# Patient Record
Sex: MALE | Race: BLACK OR AFRICAN AMERICAN | NOT HISPANIC OR LATINO | URBAN - METROPOLITAN AREA
[De-identification: names, ages, dates, MRNs, and addresses within clinical notes are randomized per-mention and may not be internally consistent; named-entity substitution may affect disease eponyms.]

---

## 2020-06-05 ENCOUNTER — OFFICE VISIT (OUTPATIENT)
Dept: URBAN - METROPOLITAN AREA CLINIC 70 | Facility: CLINIC | Age: 75
End: 2020-06-05
Payer: MEDICARE

## 2020-06-05 ENCOUNTER — LAB OUTSIDE AN ENCOUNTER (OUTPATIENT)
Dept: URBAN - METROPOLITAN AREA CLINIC 70 | Facility: CLINIC | Age: 75
End: 2020-06-05

## 2020-06-05 DIAGNOSIS — D50.0 IRON DEFICIENCY ANEMIA DUE TO CHRONIC BLOOD LOSS: ICD-10-CM

## 2020-06-05 PROCEDURE — 99213 OFFICE O/P EST LOW 20 MIN: CPT | Performed by: INTERNAL MEDICINE

## 2020-06-05 PROCEDURE — G8427 DOCREV CUR MEDS BY ELIG CLIN: HCPCS | Performed by: INTERNAL MEDICINE

## 2020-06-05 PROCEDURE — 3017F COLORECTAL CA SCREEN DOC REV: CPT | Performed by: INTERNAL MEDICINE

## 2020-06-05 PROCEDURE — G8418 CALC BMI BLW LOW PARAM F/U: HCPCS | Performed by: INTERNAL MEDICINE

## 2020-06-05 PROCEDURE — G9903 PT SCRN TBCO ID AS NON USER: HCPCS | Performed by: INTERNAL MEDICINE

## 2020-06-05 PROCEDURE — 1036F TOBACCO NON-USER: CPT | Performed by: INTERNAL MEDICINE

## 2020-06-05 RX ORDER — ASPIRIN 81 MG/1
(PRIOR AUTH#:000009151623) TABLET ORAL
Qty: 120 DELAYED RELEASE TABLET | Status: ACTIVE | COMMUNITY

## 2020-06-05 RX ORDER — GLIPIZIDE 5 MG/1
(PRIOR AUTH#:000009151628) TABLET ORAL
Qty: 180 DELAYED RELEASE TABLET | Status: ACTIVE | COMMUNITY

## 2020-06-05 RX ORDER — PRAVASTATIN SODIUM 40 MG/1
(PRIOR AUTH#:000009151634) TABLET ORAL
Qty: 90 DELAYED RELEASE TABLET | Status: ACTIVE | COMMUNITY

## 2020-06-05 RX ORDER — LORATADINE 10 MG/1
(PRIOR AUTH#:000009151631) TABLET ORAL
Qty: 90 DELAYED RELEASE TABLET | Status: ACTIVE | COMMUNITY

## 2020-06-05 RX ORDER — LOSARTAN POTASSIUM 25 MG/1
(PRIOR AUTH#:000009151632) TABLET ORAL
Qty: 90 DELAYED RELEASE TABLET | Status: ACTIVE | COMMUNITY

## 2020-06-05 RX ORDER — FLUTICASONE PROPIONATE 50 UG/1
(PRIOR AUTH#:000009151627) SPRAY, METERED NASAL
Qty: 3 SP | Status: ACTIVE | COMMUNITY

## 2020-06-05 RX ORDER — FERROUS SULFATE 325(65) MG
(PRIOR AUTH#:000009252611) TABLET ORAL
Qty: 30 DELAYED RELEASE TABLET | Status: ACTIVE | COMMUNITY

## 2020-06-05 NOTE — HPI-TODAY'S VISIT:
pt feels well  hgb was ok   he cont on iron tid   he has not been able to get copy of colonoscopy  i have looked at all the place i know to look and check for   he cannot give he a name or address of where this was done

## 2020-07-06 ENCOUNTER — OFFICE VISIT (OUTPATIENT)
Dept: URBAN - METROPOLITAN AREA SURGERY CENTER 24 | Facility: SURGERY CENTER | Age: 75
End: 2020-07-06

## 2020-07-06 ENCOUNTER — CLAIMS CREATED FROM THE CLAIM WINDOW (OUTPATIENT)
Dept: URBAN - METROPOLITAN AREA CLINIC 4 | Facility: CLINIC | Age: 75
End: 2020-07-06
Payer: MEDICARE

## 2020-07-06 ENCOUNTER — OFFICE VISIT (OUTPATIENT)
Dept: URBAN - METROPOLITAN AREA SURGERY CENTER 24 | Facility: SURGERY CENTER | Age: 75
End: 2020-07-06
Payer: MEDICARE

## 2020-07-06 DIAGNOSIS — K63.5 BENIGN COLON POLYP: ICD-10-CM

## 2020-07-06 DIAGNOSIS — D50.9 ANEMIA, IRON DEFICIENCY: ICD-10-CM

## 2020-07-06 DIAGNOSIS — D12.5 BENIGN NEOPLASM OF SIGMOID COLON: ICD-10-CM

## 2020-07-06 PROCEDURE — G9937 DIG OR SURV COLSCO: HCPCS | Performed by: INTERNAL MEDICINE

## 2020-07-06 PROCEDURE — G8907 PT DOC NO EVENTS ON DISCHARG: HCPCS | Performed by: INTERNAL MEDICINE

## 2020-07-06 PROCEDURE — 88305 TISSUE EXAM BY PATHOLOGIST: CPT | Performed by: PATHOLOGY

## 2020-07-06 PROCEDURE — 45380 COLONOSCOPY AND BIOPSY: CPT | Performed by: INTERNAL MEDICINE

## 2020-07-06 RX ORDER — LORATADINE 10 MG/1
(PRIOR AUTH#:000009151631) TABLET ORAL
Qty: 90 DELAYED RELEASE TABLET | Status: ACTIVE | COMMUNITY

## 2020-07-06 RX ORDER — LOSARTAN POTASSIUM 25 MG/1
(PRIOR AUTH#:000009151632) TABLET ORAL
Qty: 90 DELAYED RELEASE TABLET | Status: ACTIVE | COMMUNITY

## 2020-07-06 RX ORDER — PRAVASTATIN SODIUM 40 MG/1
(PRIOR AUTH#:000009151634) TABLET ORAL
Qty: 90 DELAYED RELEASE TABLET | Status: ACTIVE | COMMUNITY

## 2020-07-06 RX ORDER — FLUTICASONE PROPIONATE 50 UG/1
(PRIOR AUTH#:000009151627) SPRAY, METERED NASAL
Qty: 3 SP | Status: ACTIVE | COMMUNITY

## 2020-07-06 RX ORDER — GLIPIZIDE 5 MG/1
(PRIOR AUTH#:000009151628) TABLET ORAL
Qty: 180 DELAYED RELEASE TABLET | Status: ACTIVE | COMMUNITY

## 2020-07-06 RX ORDER — ASPIRIN 81 MG/1
(PRIOR AUTH#:000009151623) TABLET ORAL
Qty: 120 DELAYED RELEASE TABLET | Status: ACTIVE | COMMUNITY

## 2020-07-06 RX ORDER — FERROUS SULFATE 325(65) MG
(PRIOR AUTH#:000009252611) TABLET ORAL
Qty: 30 DELAYED RELEASE TABLET | Status: ACTIVE | COMMUNITY

## 2020-07-10 ENCOUNTER — OFFICE VISIT (OUTPATIENT)
Dept: URBAN - METROPOLITAN AREA SURGERY CENTER 24 | Facility: SURGERY CENTER | Age: 75
End: 2020-07-10

## 2020-10-02 ENCOUNTER — WEB ENCOUNTER (OUTPATIENT)
Dept: URBAN - METROPOLITAN AREA CLINIC 70 | Facility: CLINIC | Age: 75
End: 2020-10-02

## 2020-10-02 ENCOUNTER — OFFICE VISIT (OUTPATIENT)
Dept: URBAN - METROPOLITAN AREA CLINIC 70 | Facility: CLINIC | Age: 75
End: 2020-10-02
Payer: MEDICARE

## 2020-10-02 DIAGNOSIS — D64.9 ANEMIA: ICD-10-CM

## 2020-10-02 PROCEDURE — 3017F COLORECTAL CA SCREEN DOC REV: CPT | Performed by: INTERNAL MEDICINE

## 2020-10-02 PROCEDURE — 99213 OFFICE O/P EST LOW 20 MIN: CPT | Performed by: INTERNAL MEDICINE

## 2020-10-02 PROCEDURE — G8427 DOCREV CUR MEDS BY ELIG CLIN: HCPCS | Performed by: INTERNAL MEDICINE

## 2020-10-02 PROCEDURE — 1036F TOBACCO NON-USER: CPT | Performed by: INTERNAL MEDICINE

## 2020-10-02 PROCEDURE — G8417 CALC BMI ABV UP PARAM F/U: HCPCS | Performed by: INTERNAL MEDICINE

## 2020-10-02 PROCEDURE — G8484 FLU IMMUNIZE NO ADMIN: HCPCS | Performed by: INTERNAL MEDICINE

## 2020-10-02 RX ORDER — LORATADINE 10 MG/1
(PRIOR AUTH#:000009151631) TABLET ORAL
Qty: 90 DELAYED RELEASE TABLET | Status: ACTIVE | COMMUNITY

## 2020-10-02 RX ORDER — LOSARTAN POTASSIUM 25 MG/1
(PRIOR AUTH#:000009151632) TABLET ORAL
Qty: 90 DELAYED RELEASE TABLET | Status: ACTIVE | COMMUNITY

## 2020-10-02 RX ORDER — ASPIRIN 81 MG/1
(PRIOR AUTH#:000009151623) TABLET ORAL
Qty: 120 DELAYED RELEASE TABLET | Status: ACTIVE | COMMUNITY

## 2020-10-02 RX ORDER — GLIPIZIDE 5 MG/1
(PRIOR AUTH#:000009151628) TABLET ORAL
Qty: 180 DELAYED RELEASE TABLET | Status: ACTIVE | COMMUNITY

## 2020-10-02 RX ORDER — PRAVASTATIN SODIUM 40 MG/1
(PRIOR AUTH#:000009151634) TABLET ORAL
Qty: 90 DELAYED RELEASE TABLET | Status: ACTIVE | COMMUNITY

## 2020-10-02 RX ORDER — FERROUS SULFATE 325(65) MG
(PRIOR AUTH#:000009252611) TABLET ORAL
Qty: 30 DELAYED RELEASE TABLET | Status: ACTIVE | COMMUNITY

## 2020-10-02 RX ORDER — FLUTICASONE PROPIONATE 50 UG/1
(PRIOR AUTH#:000009151627) SPRAY, METERED NASAL
Qty: 3 SP | Status: ACTIVE | COMMUNITY

## 2020-10-06 LAB
A/G RATIO: 1.8
ALBUMIN: 4.4
ALKALINE PHOSPHATASE: 90
ALT (SGPT): 11
AST (SGOT): 16
BASO (ABSOLUTE): 0.1
BASOS: 1
BILIRUBIN, TOTAL: 0.5
BUN/CREATININE RATIO: 9
BUN: 15
CALCIUM: 10.2
CARBON DIOXIDE, TOTAL: 20
CHLORIDE: 101
CREATININE: 1.74
EGFR IF AFRICN AM: 44
EGFR IF NONAFRICN AM: 38
EOS (ABSOLUTE): 0.2
EOS: 3
GLOBULIN, TOTAL: 2.4
GLUCOSE: 329
HEMATOCRIT: 44.8
HEMATOLOGY COMMENTS:: (no result)
HEMOGLOBIN: 14.6
IMMATURE CELLS: (no result)
IMMATURE GRANS (ABS): 0
IMMATURE GRANULOCYTES: 0
LYMPHS (ABSOLUTE): 2.2
LYMPHS: 30
MCH: 31.4
MCHC: 32.6
MCV: 96
MONOCYTES(ABSOLUTE): 0.5
MONOCYTES: 7
NEUTROPHILS (ABSOLUTE): 4.4
NEUTROPHILS: 59
NRBC: (no result)
PLATELETS: 264
POTASSIUM: 4.9
PROTEIN, TOTAL: 6.8
RBC: 4.65
RDW: 12
SODIUM: 137
WBC: 7.3

## 2020-12-02 ENCOUNTER — DASHBOARD ENCOUNTERS (OUTPATIENT)
Age: 75
End: 2020-12-02

## 2020-12-02 ENCOUNTER — OFFICE VISIT (OUTPATIENT)
Dept: URBAN - METROPOLITAN AREA CLINIC 70 | Facility: CLINIC | Age: 75
End: 2020-12-02
Payer: MEDICARE

## 2020-12-02 VITALS
DIASTOLIC BLOOD PRESSURE: 77 MMHG | BODY MASS INDEX: 28.82 KG/M2 | SYSTOLIC BLOOD PRESSURE: 156 MMHG | WEIGHT: 173 LBS | HEART RATE: 77 BPM | TEMPERATURE: 97.9 F | HEIGHT: 65 IN

## 2020-12-02 DIAGNOSIS — D50.9 IRON DEFICIENCY ANEMIA: ICD-10-CM

## 2020-12-02 PROBLEM — 87522002 IRON DEFICIENCY ANEMIA: Status: ACTIVE | Noted: 2020-12-02

## 2020-12-02 PROCEDURE — G8482 FLU IMMUNIZE ORDER/ADMIN: HCPCS | Performed by: INTERNAL MEDICINE

## 2020-12-02 PROCEDURE — 99213 OFFICE O/P EST LOW 20 MIN: CPT | Performed by: INTERNAL MEDICINE

## 2020-12-02 PROCEDURE — G8427 DOCREV CUR MEDS BY ELIG CLIN: HCPCS | Performed by: INTERNAL MEDICINE

## 2020-12-02 PROCEDURE — 3017F COLORECTAL CA SCREEN DOC REV: CPT | Performed by: INTERNAL MEDICINE

## 2020-12-02 PROCEDURE — G8417 CALC BMI ABV UP PARAM F/U: HCPCS | Performed by: INTERNAL MEDICINE

## 2020-12-02 PROCEDURE — 1036F TOBACCO NON-USER: CPT | Performed by: INTERNAL MEDICINE

## 2020-12-02 RX ORDER — LORATADINE 10 MG/1
(PRIOR AUTH#:000009151631) TABLET ORAL
Qty: 90 DELAYED RELEASE TABLET | Status: ACTIVE | COMMUNITY

## 2020-12-02 RX ORDER — ASPIRIN 81 MG/1
(PRIOR AUTH#:000009151623) TABLET ORAL
Qty: 120 DELAYED RELEASE TABLET | Status: ACTIVE | COMMUNITY

## 2020-12-02 RX ORDER — LOSARTAN POTASSIUM 25 MG/1
(PRIOR AUTH#:000009151632) TABLET ORAL
Qty: 90 DELAYED RELEASE TABLET | Status: ACTIVE | COMMUNITY

## 2020-12-02 RX ORDER — FLUTICASONE PROPIONATE 50 UG/1
(PRIOR AUTH#:000009151627) SPRAY, METERED NASAL
Qty: 3 SP | Status: ACTIVE | COMMUNITY

## 2020-12-02 RX ORDER — FERROUS SULFATE 325(65) MG
(PRIOR AUTH#:000009252611) TABLET ORAL
Qty: 30 DELAYED RELEASE TABLET | Status: ACTIVE | COMMUNITY

## 2020-12-02 RX ORDER — GLIPIZIDE 5 MG/1
(PRIOR AUTH#:000009151628) TABLET ORAL
Qty: 180 DELAYED RELEASE TABLET | Status: ACTIVE | COMMUNITY

## 2020-12-02 RX ORDER — PRAVASTATIN SODIUM 40 MG/1
(PRIOR AUTH#:000009151634) TABLET ORAL
Qty: 90 DELAYED RELEASE TABLET | Status: ACTIVE | COMMUNITY

## 2020-12-02 NOTE — HPI-TODAY'S VISIT:
pt feels well  no gi sxs   hgb 14  glucose was high  he has worked onthat and reports last glucose he checked was aroud 150

## 2024-05-20 ENCOUNTER — OFFICE VISIT (OUTPATIENT)
Dept: URBAN - METROPOLITAN AREA CLINIC 70 | Facility: CLINIC | Age: 79
End: 2024-05-20

## 2024-05-28 ENCOUNTER — OFFICE VISIT (OUTPATIENT)
Dept: URBAN - METROPOLITAN AREA CLINIC 70 | Facility: CLINIC | Age: 79
End: 2024-05-28

## 2024-05-28 RX ORDER — LOSARTAN POTASSIUM 25 MG/1
(PRIOR AUTH#:000009151632) TABLET ORAL
Qty: 90 DELAYED RELEASE TABLET | Status: ACTIVE | COMMUNITY

## 2024-05-28 RX ORDER — LORATADINE 10 MG/1
(PRIOR AUTH#:000009151631) TABLET ORAL
Qty: 90 DELAYED RELEASE TABLET | Status: ACTIVE | COMMUNITY

## 2024-05-28 RX ORDER — FERROUS SULFATE 325(65) MG
(PRIOR AUTH#:000009252611) TABLET ORAL
Qty: 30 DELAYED RELEASE TABLET | Status: ACTIVE | COMMUNITY

## 2024-05-28 RX ORDER — ASPIRIN 81 MG/1
(PRIOR AUTH#:000009151623) TABLET ORAL
Qty: 120 DELAYED RELEASE TABLET | Status: ACTIVE | COMMUNITY

## 2024-05-28 RX ORDER — PRAVASTATIN SODIUM 40 MG/1
(PRIOR AUTH#:000009151634) TABLET ORAL
Qty: 90 DELAYED RELEASE TABLET | Status: ACTIVE | COMMUNITY

## 2024-05-28 RX ORDER — FLUTICASONE PROPIONATE 50 UG/1
(PRIOR AUTH#:000009151627) SPRAY, METERED NASAL
Qty: 3 SP | Status: ACTIVE | COMMUNITY

## 2024-05-28 RX ORDER — GLIPIZIDE 5 MG/1
(PRIOR AUTH#:000009151628) TABLET ORAL
Qty: 180 DELAYED RELEASE TABLET | Status: ACTIVE | COMMUNITY

## 2024-06-06 ENCOUNTER — OFFICE VISIT (OUTPATIENT)
Dept: URBAN - METROPOLITAN AREA CLINIC 70 | Facility: CLINIC | Age: 79
End: 2024-06-06
Payer: OTHER GOVERNMENT

## 2024-06-06 ENCOUNTER — LAB OUTSIDE AN ENCOUNTER (OUTPATIENT)
Dept: URBAN - METROPOLITAN AREA CLINIC 70 | Facility: CLINIC | Age: 79
End: 2024-06-06

## 2024-06-06 VITALS
BODY MASS INDEX: 29.43 KG/M2 | WEIGHT: 172.4 LBS | TEMPERATURE: 98.7 F | HEIGHT: 64 IN | HEART RATE: 109 BPM | DIASTOLIC BLOOD PRESSURE: 76 MMHG | SYSTOLIC BLOOD PRESSURE: 153 MMHG

## 2024-06-06 DIAGNOSIS — Z86.010 HISTORY OF COLON POLYPS: ICD-10-CM

## 2024-06-06 DIAGNOSIS — K59.09 CHRONIC CONSTIPATION: ICD-10-CM

## 2024-06-06 PROBLEM — 428283002: Status: ACTIVE | Noted: 2024-06-06

## 2024-06-06 PROCEDURE — 99204 OFFICE O/P NEW MOD 45 MIN: CPT | Performed by: NURSE PRACTITIONER

## 2024-06-06 RX ORDER — SILDENAFIL 20 MG/1
TABLET, FILM COATED ORAL
Qty: 12 TABLET | Status: ACTIVE | COMMUNITY

## 2024-06-06 RX ORDER — AMLODIPINE BESYLATE 10 MG/1
TABLET ORAL
Qty: 45 TABLET | Status: ACTIVE | COMMUNITY

## 2024-06-06 RX ORDER — CYCLOBENZAPRINE HYDROCHLORIDE 10 MG/1
TABLET, FILM COATED ORAL
Qty: 90 TABLET | Status: ACTIVE | COMMUNITY

## 2024-06-06 RX ORDER — GABAPENTIN 100 MG/1
CAPSULE ORAL
Qty: 540 CAPSULE | Status: ACTIVE | COMMUNITY

## 2024-06-06 RX ORDER — FLUTICASONE PROPIONATE 50 UG/1
(PRIOR AUTH#:000009151627) SPRAY, METERED NASAL
Qty: 3 SP | Status: ACTIVE | COMMUNITY

## 2024-06-06 RX ORDER — LOSARTAN POTASSIUM 25 MG/1
(PRIOR AUTH#:000009151632) TABLET ORAL
Qty: 90 DELAYED RELEASE TABLET | Status: ACTIVE | COMMUNITY

## 2024-06-06 RX ORDER — FERROUS SULFATE 325(65) MG
(PRIOR AUTH#:000009252611) TABLET ORAL
Qty: 30 DELAYED RELEASE TABLET | Status: ACTIVE | COMMUNITY

## 2024-06-06 RX ORDER — ASPIRIN 81 MG/1
(PRIOR AUTH#:000009151623) TABLET ORAL
Qty: 120 DELAYED RELEASE TABLET | Status: ACTIVE | COMMUNITY

## 2024-06-06 RX ORDER — TRAZODONE HYDROCHLORIDE 50 MG/1
TABLET ORAL
Qty: 180 TABLET | Status: ACTIVE | COMMUNITY

## 2024-06-06 RX ORDER — BISACODYL 5 MG/1
TABLET ORAL
Qty: 4 TABLET | Status: ACTIVE | COMMUNITY

## 2024-06-06 RX ORDER — PRAVASTATIN SODIUM 40 MG/1
(PRIOR AUTH#:000009151634) TABLET ORAL
Qty: 90 DELAYED RELEASE TABLET | Status: ACTIVE | COMMUNITY

## 2024-06-06 RX ORDER — LORATADINE 10 MG/1
(PRIOR AUTH#:000009151631) TABLET ORAL
Qty: 90 DELAYED RELEASE TABLET | Status: ACTIVE | COMMUNITY

## 2024-06-06 RX ORDER — INSULIN GLARGINE 100 [IU]/ML
INJECTION, SOLUTION SUBCUTANEOUS
Qty: 15 UNSPECIFIED | Status: ACTIVE | COMMUNITY

## 2024-06-06 RX ORDER — UREA 10 %
LOTION (ML) TOPICAL
Qty: 90 GRAM | Status: ACTIVE | COMMUNITY

## 2024-06-06 RX ORDER — GLIPIZIDE 5 MG/1
(PRIOR AUTH#:000009151628) TABLET ORAL
Qty: 180 DELAYED RELEASE TABLET | Status: ACTIVE | COMMUNITY

## 2024-06-06 NOTE — HPI-TODAY'S VISIT:
Patient is a 78 y/o male who presents today for constipation. He is previously known to our group due to hx of colon polyps (adenomatous polyp 2011) and AZAEL with last colonoscopy 7/6/20 with findings of hyperplastic polyp and diverticulosis with recall in 5 years. He is having BM daily but with feeling on incomplete evacuation. Is not taking any medication for constipation. No Fhx of colon cancer. Denies abdominal pain, wt loss, N/V, diarrhea, or signs of GI bleeding.

## 2024-07-22 ENCOUNTER — OFFICE VISIT (OUTPATIENT)
Dept: URBAN - METROPOLITAN AREA CLINIC 70 | Facility: CLINIC | Age: 79
End: 2024-07-22
Payer: MEDICARE

## 2024-07-22 VITALS
BODY MASS INDEX: 29.81 KG/M2 | DIASTOLIC BLOOD PRESSURE: 78 MMHG | TEMPERATURE: 97.3 F | WEIGHT: 174.6 LBS | HEART RATE: 90 BPM | SYSTOLIC BLOOD PRESSURE: 164 MMHG | HEIGHT: 64 IN

## 2024-07-22 DIAGNOSIS — Z86.010 HISTORY OF COLON POLYPS: ICD-10-CM

## 2024-07-22 DIAGNOSIS — K59.09 CHRONIC CONSTIPATION: ICD-10-CM

## 2024-07-22 PROCEDURE — 99214 OFFICE O/P EST MOD 30 MIN: CPT | Performed by: NURSE PRACTITIONER

## 2024-07-22 RX ORDER — FLUTICASONE PROPIONATE 50 UG/1
(PRIOR AUTH#:000009151627) SPRAY, METERED NASAL
Qty: 3 SP | Status: ACTIVE | COMMUNITY

## 2024-07-22 RX ORDER — TRAZODONE HYDROCHLORIDE 50 MG/1
TABLET ORAL
Qty: 180 TABLET | Status: ACTIVE | COMMUNITY

## 2024-07-22 RX ORDER — BISACODYL 5 MG/1
TABLET ORAL
Qty: 4 TABLET | Status: ACTIVE | COMMUNITY

## 2024-07-22 RX ORDER — PRAVASTATIN SODIUM 40 MG/1
(PRIOR AUTH#:000009151634) TABLET ORAL
Qty: 90 DELAYED RELEASE TABLET | Status: ACTIVE | COMMUNITY

## 2024-07-22 RX ORDER — INSULIN GLARGINE 100 [IU]/ML
INJECTION, SOLUTION SUBCUTANEOUS
Qty: 15 UNSPECIFIED | Status: ACTIVE | COMMUNITY

## 2024-07-22 RX ORDER — SILDENAFIL 20 MG/1
TABLET, FILM COATED ORAL
Qty: 12 TABLET | Status: ACTIVE | COMMUNITY

## 2024-07-22 RX ORDER — GABAPENTIN 100 MG/1
CAPSULE ORAL
Qty: 540 CAPSULE | Status: ACTIVE | COMMUNITY

## 2024-07-22 RX ORDER — AMLODIPINE BESYLATE 10 MG/1
TABLET ORAL
Qty: 45 TABLET | Status: ACTIVE | COMMUNITY

## 2024-07-22 RX ORDER — LORATADINE 10 MG/1
(PRIOR AUTH#:000009151631) TABLET ORAL
Qty: 90 DELAYED RELEASE TABLET | Status: ACTIVE | COMMUNITY

## 2024-07-22 RX ORDER — ASPIRIN 81 MG/1
(PRIOR AUTH#:000009151623) TABLET ORAL
Qty: 120 DELAYED RELEASE TABLET | Status: ACTIVE | COMMUNITY

## 2024-07-22 RX ORDER — CYCLOBENZAPRINE HYDROCHLORIDE 10 MG/1
TABLET, FILM COATED ORAL
Qty: 90 TABLET | Status: ACTIVE | COMMUNITY

## 2024-07-22 RX ORDER — FERROUS SULFATE 325(65) MG
(PRIOR AUTH#:000009252611) TABLET ORAL
Qty: 30 DELAYED RELEASE TABLET | Status: ACTIVE | COMMUNITY

## 2024-07-22 RX ORDER — UREA 10 %
LOTION (ML) TOPICAL
Qty: 90 GRAM | Status: ACTIVE | COMMUNITY

## 2024-07-22 RX ORDER — LOSARTAN POTASSIUM 25 MG/1
(PRIOR AUTH#:000009151632) TABLET ORAL
Qty: 90 DELAYED RELEASE TABLET | Status: ACTIVE | COMMUNITY

## 2024-07-22 RX ORDER — GLIPIZIDE 5 MG/1
(PRIOR AUTH#:000009151628) TABLET ORAL
Qty: 180 DELAYED RELEASE TABLET | Status: ACTIVE | COMMUNITY

## 2024-07-22 NOTE — HPI-TODAY'S VISIT:
OV 6/6/24: Patient is a 78 y/o male who presents today for constipation. He is previously known to our group due to hx of colon polyps (adenomatous polyp 2011) and AZAEL with last colonoscopy 7/6/20 with findings of hyperplastic polyp and diverticulosis with recall in 5 years. He is having BM daily but with feeling on incomplete evacuation. Is not taking any medication for constipation. No Fhx of colon cancer. Denies abdominal pain, wt loss, N/V, diarrhea, or signs of GI bleeding. -------------------- Today 7/22/24: Patient presents today for follow up. KUB 6/10/24 showed constipation but no obstruction or acute process. He reports symptoms now improved with taking daily miralax. No new or current GI complaints at this time.

## 2025-01-13 ENCOUNTER — OFFICE VISIT (OUTPATIENT)
Dept: URBAN - METROPOLITAN AREA CLINIC 70 | Facility: CLINIC | Age: 80
End: 2025-01-13
Payer: COMMERCIAL

## 2025-01-13 ENCOUNTER — LAB OUTSIDE AN ENCOUNTER (OUTPATIENT)
Dept: URBAN - METROPOLITAN AREA CLINIC 70 | Facility: CLINIC | Age: 80
End: 2025-01-13

## 2025-01-13 VITALS
WEIGHT: 173.8 LBS | HEIGHT: 64 IN | BODY MASS INDEX: 29.67 KG/M2 | DIASTOLIC BLOOD PRESSURE: 71 MMHG | OXYGEN SATURATION: 98 % | TEMPERATURE: 98.2 F | HEART RATE: 87 BPM | SYSTOLIC BLOOD PRESSURE: 161 MMHG

## 2025-01-13 DIAGNOSIS — Z86.0100 HISTORY OF COLON POLYPS: ICD-10-CM

## 2025-01-13 DIAGNOSIS — K59.09 CHRONIC CONSTIPATION: ICD-10-CM

## 2025-01-13 PROCEDURE — 99214 OFFICE O/P EST MOD 30 MIN: CPT | Performed by: NURSE PRACTITIONER

## 2025-01-13 RX ORDER — CYCLOBENZAPRINE HYDROCHLORIDE 10 MG/1
TABLET, FILM COATED ORAL
Qty: 90 TABLET | COMMUNITY

## 2025-01-13 RX ORDER — FLUTICASONE PROPIONATE 50 UG/1
(PRIOR AUTH#:000009151627) SPRAY, METERED NASAL
Qty: 3 SP | Status: ACTIVE | COMMUNITY

## 2025-01-13 RX ORDER — BISACODYL 5 MG/1
TABLET ORAL
Qty: 4 TABLET | Status: DISCONTINUED | COMMUNITY

## 2025-01-13 RX ORDER — ACETAMINOPHEN 325 MG/1
1 TABLET AS NEEDED TABLET ORAL
Status: ACTIVE | COMMUNITY
Start: 2025-01-13

## 2025-01-13 RX ORDER — ASPIRIN 81 MG/1
(PRIOR AUTH#:000009151623) TABLET ORAL
Qty: 120 DELAYED RELEASE TABLET | Status: ACTIVE | COMMUNITY

## 2025-01-13 RX ORDER — INSULIN GLARGINE 100 [IU]/ML
INJECTION, SOLUTION SUBCUTANEOUS
Qty: 15 UNSPECIFIED | Status: ACTIVE | COMMUNITY

## 2025-01-13 RX ORDER — TRAZODONE HYDROCHLORIDE 50 MG/1
TABLET ORAL
Qty: 180 TABLET | Status: DISCONTINUED | COMMUNITY

## 2025-01-13 RX ORDER — PRAVASTATIN SODIUM 40 MG/1
(PRIOR AUTH#:000009151634) TABLET ORAL
Qty: 90 DELAYED RELEASE TABLET | Status: ACTIVE | COMMUNITY

## 2025-01-13 RX ORDER — LORATADINE 10 MG/1
(PRIOR AUTH#:000009151631) TABLET ORAL
Qty: 90 DELAYED RELEASE TABLET | Status: ACTIVE | COMMUNITY

## 2025-01-13 RX ORDER — LOSARTAN POTASSIUM 50 MG/1
1 TABLET TABLET, FILM COATED ORAL ONCE A DAY
Qty: 90 TABLET | Status: ACTIVE | COMMUNITY

## 2025-01-13 RX ORDER — FERROUS SULFATE 325(65) MG
(PRIOR AUTH#:000009252611) TABLET ORAL
Qty: 30 DELAYED RELEASE TABLET | Status: ACTIVE | COMMUNITY

## 2025-01-13 RX ORDER — EMPAGLIFLOZIN 10 MG/1
TABLET, FILM COATED ORAL
Qty: 90 TABLET | Status: ACTIVE | COMMUNITY

## 2025-01-13 RX ORDER — SILDENAFIL 20 MG/1
TABLET, FILM COATED ORAL
Qty: 12 TABLET | Status: DISCONTINUED | COMMUNITY

## 2025-01-13 RX ORDER — AMLODIPINE BESYLATE 10 MG/1
TABLET ORAL
Qty: 45 TABLET | Status: ACTIVE | COMMUNITY

## 2025-01-13 RX ORDER — GABAPENTIN 100 MG/1
CAPSULE ORAL
Qty: 540 CAPSULE | Status: DISCONTINUED | COMMUNITY

## 2025-01-13 RX ORDER — UREA 10 %
LOTION (ML) TOPICAL
Qty: 90 GRAM | Status: DISCONTINUED | COMMUNITY

## 2025-01-13 RX ORDER — GLIPIZIDE 5 MG/1
(PRIOR AUTH#:000009151628) TABLET ORAL
Qty: 180 DELAYED RELEASE TABLET | Status: ACTIVE | COMMUNITY

## 2025-01-13 NOTE — HPI-TODAY'S VISIT:
OV 6/6/24: Patient is a 78 y/o male who presents today for constipation. He is previously known to our group due to hx of colon polyps (adenomatous polyp 2011) and AZAEL with last colonoscopy 7/6/20 with findings of hyperplastic polyp and diverticulosis with recall in 5 years. He is having BM daily but with feeling on incomplete evacuation. Is not taking any medication for constipation. No Fhx of colon cancer. Denies abdominal pain, wt loss, N/V, diarrhea, or signs of GI bleeding. - - - - - - - - - -  OV 7/22/24: Patient presents today for follow up. KUB 6/10/24 showed constipation but no obstruction or acute process. He reports symptoms now improved with taking daily miralax. No new or current GI complaints at this time. - - - - - - - - - - - Today 1/13/25: Patient presents today for surveillance colonoscopy due to hx of colon polyps. Last colonoscopy 7/6/20 with findings of hyperplastic polyp and diverticulosis with recall in 5 years. No current GI complaints. Constipation remains controlled with daily miralax. Denies abdominal pain, wt loss, diarrhea, or rectal bleeding.

## 2025-01-15 ENCOUNTER — TELEPHONE ENCOUNTER (OUTPATIENT)
Dept: URBAN - METROPOLITAN AREA CLINIC 70 | Facility: CLINIC | Age: 80
End: 2025-01-15

## 2025-01-15 RX ORDER — POLYETHYLENE GLYCOL 3350, SODIUM SULFATE ANHYDROUS, SODIUM BICARBONATE, SODIUM CHLORIDE, POTASSIUM CHLORIDE 236; 22.74; 6.74; 5.86; 2.97 G/4L; G/4L; G/4L; G/4L; G/4L
AS DIRECTED POWDER, FOR SOLUTION ORAL ONCE
Qty: 236 GRAM | Refills: 0 | OUTPATIENT
Start: 2025-01-15 | End: 2025-01-16

## 2025-02-11 ENCOUNTER — OFFICE VISIT (OUTPATIENT)
Dept: URBAN - METROPOLITAN AREA SURGERY CENTER 24 | Facility: SURGERY CENTER | Age: 80
End: 2025-02-11

## 2025-03-13 ENCOUNTER — OFFICE VISIT (OUTPATIENT)
Dept: URBAN - METROPOLITAN AREA SURGERY CENTER 23 | Facility: SURGERY CENTER | Age: 80
End: 2025-03-13

## 2025-03-19 ENCOUNTER — OFFICE VISIT (OUTPATIENT)
Dept: URBAN - METROPOLITAN AREA CLINIC 70 | Facility: CLINIC | Age: 80
End: 2025-03-19

## 2025-04-30 PROBLEM — 724556004: Status: ACTIVE | Noted: 2025-04-30

## 2025-05-14 ENCOUNTER — OFFICE VISIT (OUTPATIENT)
Dept: URBAN - METROPOLITAN AREA SURGERY CENTER 24 | Facility: SURGERY CENTER | Age: 80
End: 2025-05-14
Payer: OTHER GOVERNMENT

## 2025-05-14 ENCOUNTER — CLAIMS CREATED FROM THE CLAIM WINDOW (OUTPATIENT)
Dept: URBAN - METROPOLITAN AREA CLINIC 4 | Facility: CLINIC | Age: 80
End: 2025-05-14
Payer: OTHER GOVERNMENT

## 2025-05-14 DIAGNOSIS — D12.5 BENIGN NEOPLASM OF SIGMOID COLON: ICD-10-CM

## 2025-05-14 DIAGNOSIS — K63.89 OTHER SPECIFIED DISEASES OF INTESTINE: ICD-10-CM

## 2025-05-14 DIAGNOSIS — K63.5 BENIGN COLON POLYP: ICD-10-CM

## 2025-05-14 DIAGNOSIS — Z86.0100 PERSONAL HISTORY OF COLON POLYPS, UNSPECIFIED: ICD-10-CM

## 2025-05-14 DIAGNOSIS — Z86.0100 HISTORY OF COLON POLYPS: ICD-10-CM

## 2025-05-14 DIAGNOSIS — Z12.11 COLON CANCER SCREENING: ICD-10-CM

## 2025-05-14 PROCEDURE — 0529F INTRVL 3/>YR PTS CLNSCP DOCD: CPT | Performed by: INTERNAL MEDICINE

## 2025-05-14 PROCEDURE — 45380 COLONOSCOPY AND BIOPSY: CPT | Performed by: INTERNAL MEDICINE

## 2025-05-14 PROCEDURE — 88305 TISSUE EXAM BY PATHOLOGIST: CPT | Performed by: PATHOLOGY

## 2025-05-14 PROCEDURE — 00811 ANES LWR INTST NDSC NOS: CPT | Performed by: NURSE ANESTHETIST, CERTIFIED REGISTERED

## 2025-05-14 RX ORDER — ACETAMINOPHEN 325 MG/1
1 TABLET AS NEEDED TABLET ORAL
Status: ACTIVE | COMMUNITY
Start: 2025-01-13

## 2025-05-14 RX ORDER — LORATADINE 10 MG/1
(PRIOR AUTH#:000009151631) TABLET ORAL
Qty: 90 DELAYED RELEASE TABLET | Status: ACTIVE | COMMUNITY

## 2025-05-14 RX ORDER — FLUTICASONE PROPIONATE 50 UG/1
(PRIOR AUTH#:000009151627) SPRAY, METERED NASAL
Qty: 3 SP | Status: ACTIVE | COMMUNITY

## 2025-05-14 RX ORDER — INSULIN GLARGINE 100 [IU]/ML
INJECTION, SOLUTION SUBCUTANEOUS
Qty: 15 UNSPECIFIED | Status: ACTIVE | COMMUNITY

## 2025-05-14 RX ORDER — CYCLOBENZAPRINE HYDROCHLORIDE 10 MG/1
TABLET, FILM COATED ORAL
Qty: 90 TABLET | COMMUNITY

## 2025-05-14 RX ORDER — PRAVASTATIN SODIUM 40 MG/1
(PRIOR AUTH#:000009151634) TABLET ORAL
Qty: 90 DELAYED RELEASE TABLET | Status: ACTIVE | COMMUNITY

## 2025-05-14 RX ORDER — AMLODIPINE BESYLATE 10 MG/1
TABLET ORAL
Qty: 45 TABLET | Status: ACTIVE | COMMUNITY

## 2025-05-14 RX ORDER — EMPAGLIFLOZIN 10 MG/1
TABLET, FILM COATED ORAL
Qty: 90 TABLET | Status: ACTIVE | COMMUNITY

## 2025-05-14 RX ORDER — ASPIRIN 81 MG/1
(PRIOR AUTH#:000009151623) TABLET ORAL
Qty: 120 DELAYED RELEASE TABLET | Status: ACTIVE | COMMUNITY

## 2025-05-14 RX ORDER — GLIPIZIDE 5 MG/1
(PRIOR AUTH#:000009151628) TABLET ORAL
Qty: 180 DELAYED RELEASE TABLET | Status: ACTIVE | COMMUNITY

## 2025-05-14 RX ORDER — FERROUS SULFATE 325(65) MG
(PRIOR AUTH#:000009252611) TABLET ORAL
Qty: 30 DELAYED RELEASE TABLET | Status: ACTIVE | COMMUNITY

## 2025-05-14 RX ORDER — LOSARTAN POTASSIUM 50 MG/1
1 TABLET TABLET, FILM COATED ORAL ONCE A DAY
Qty: 90 TABLET | Status: ACTIVE | COMMUNITY